# Patient Record
Sex: FEMALE | Race: WHITE
[De-identification: names, ages, dates, MRNs, and addresses within clinical notes are randomized per-mention and may not be internally consistent; named-entity substitution may affect disease eponyms.]

---

## 2020-01-02 ENCOUNTER — APPOINTMENT (OUTPATIENT)
Dept: PEDIATRICS | Facility: CLINIC | Age: 20
End: 2020-01-02
Payer: COMMERCIAL

## 2020-01-02 VITALS — OXYGEN SATURATION: 100 % | WEIGHT: 105 LBS | HEART RATE: 85 BPM | TEMPERATURE: 98.7 F

## 2020-01-02 PROBLEM — Z00.00 ENCOUNTER FOR PREVENTIVE HEALTH EXAMINATION: Status: ACTIVE | Noted: 2020-01-02

## 2020-01-02 PROCEDURE — 99214 OFFICE O/P EST MOD 30 MIN: CPT

## 2020-01-02 NOTE — REVIEW OF SYSTEMS
[Fever] : no fever [Malaise] : malaise [Night Sweats] : no night sweats [Headache] : no headache [Nasal Congestion] : no nasal congestion [Sore Throat] : no sore throat [Tachypnea] : not tachypneic [Wheezing] : no wheezing [Cough] : no cough [Shortness of Breath] : no shortness of breath [Negative] : Skin

## 2020-01-02 NOTE — HISTORY OF PRESENT ILLNESS
[FreeTextEntry6] : HOSPITAL FOLLOW-UP SEEN AT [facility] - ADMITTED FROM 12/31/19 - 1/1/2020\par DX WITH pneumomediastinum\par PT FEELING BETTER C/O BEING FATIGUED\par \par Patient started feeling an air bubble sensation in her upper chest and throat several days ago, then the next day the pain intensified and she went to urgent care.  They did a CXR and then sent to [facility] for a CT which showed the pnuemomediastinum.  She was told to go to [facility] and she was admitted for 2 days of observation.  She had a barium swallow which was normal.  No fever, cough or vomiting to suggest infection as  a cause.  She is feeling much better just tirred.

## 2020-01-07 ENCOUNTER — APPOINTMENT (OUTPATIENT)
Dept: PEDIATRICS | Facility: CLINIC | Age: 20
End: 2020-01-07
Payer: COMMERCIAL

## 2020-01-07 VITALS — OXYGEN SATURATION: 99 % | TEMPERATURE: 97.2 F | WEIGHT: 105.7 LBS

## 2020-01-07 DIAGNOSIS — R30.0 DYSURIA: ICD-10-CM

## 2020-01-07 DIAGNOSIS — J98.2 INTERSTITIAL EMPHYSEMA: ICD-10-CM

## 2020-01-07 LAB
BILIRUB UR QL STRIP: NEGATIVE
GLUCOSE UR-MCNC: NEGATIVE
HCG UR QL: 0.2 EU/DL
HGB UR QL STRIP.AUTO: NEGATIVE
KETONES UR-MCNC: NEGATIVE
LEUKOCYTE ESTERASE UR QL STRIP: NORMAL
NITRITE UR QL STRIP: NEGATIVE
PH UR STRIP: 6.5
PROT UR STRIP-MCNC: NORMAL
SP GR UR STRIP: 1.02

## 2020-01-07 PROCEDURE — 99214 OFFICE O/P EST MOD 30 MIN: CPT | Mod: 25

## 2020-01-07 PROCEDURE — 81003 URINALYSIS AUTO W/O SCOPE: CPT | Mod: QW

## 2020-01-07 NOTE — DISCUSSION/SUMMARY
[FreeTextEntry1] : Reassure normal chest exam, may resume regular diet\par ua, urine cx if pos- Bactrim 160 mg po bid x 10 days,  follow up with GYN if sxs worsen or persist

## 2020-01-07 NOTE — HISTORY OF PRESENT ILLNESS
[de-identified] : Went to [facility] 12/31/19 for pressure in chest and difficult breathing. Chest xray and CT done, air pockets in lung. As per pt she states she has been doing better but still has some discomfort but no pain. No recent fevers. [FreeTextEntry6] : Dxd with pneumomediastinum abt 2 weeks, stayed in hospital for observation.  Here today for follow up.\sunshine Still has an occas "air bubble" feeling in upper chest but seems better.  Tolerating a more solid diet now\sunshine Also feels  a little bit of vaginal itching, no d/c.  Possible mild UTI sxs, she has had one before and it feels a bit similar.

## 2020-01-10 LAB — BACTERIA UR CULT: NORMAL

## 2022-01-31 NOTE — REVIEW OF SYSTEMS
[Cough] : cough [Shortness of Breath] : no shortness of breath [Dysuria] : dysuria [Hematuria] : no hematuria [Vaginal Dischage] : no vaginal discharge [Vaginal Itch] : vaginal itch [Negative] : Gastrointestinal no recurring infections/no persistent infections/no recurring pneumonia